# Patient Record
Sex: FEMALE | Race: WHITE | NOT HISPANIC OR LATINO | URBAN - METROPOLITAN AREA
[De-identification: names, ages, dates, MRNs, and addresses within clinical notes are randomized per-mention and may not be internally consistent; named-entity substitution may affect disease eponyms.]

---

## 2024-09-25 ENCOUNTER — CLINICAL SUPPORT (OUTPATIENT)
Dept: EMERGENCY MEDICINE | Facility: HOSPITAL | Age: 64
End: 2024-09-25
Payer: COMMERCIAL

## 2024-09-25 ENCOUNTER — APPOINTMENT (OUTPATIENT)
Dept: RADIOLOGY | Facility: HOSPITAL | Age: 64
End: 2024-09-25
Payer: COMMERCIAL

## 2024-09-25 ENCOUNTER — HOSPITAL ENCOUNTER (OUTPATIENT)
Facility: HOSPITAL | Age: 64
Setting detail: OBSERVATION
Discharge: HOME | End: 2024-09-25
Attending: STUDENT IN AN ORGANIZED HEALTH CARE EDUCATION/TRAINING PROGRAM
Payer: COMMERCIAL

## 2024-09-25 ENCOUNTER — APPOINTMENT (OUTPATIENT)
Dept: CARDIOLOGY | Facility: HOSPITAL | Age: 64
End: 2024-09-25
Payer: COMMERCIAL

## 2024-09-25 VITALS
SYSTOLIC BLOOD PRESSURE: 102 MMHG | WEIGHT: 160 LBS | HEIGHT: 66 IN | HEART RATE: 67 BPM | OXYGEN SATURATION: 100 % | BODY MASS INDEX: 25.71 KG/M2 | DIASTOLIC BLOOD PRESSURE: 88 MMHG | RESPIRATION RATE: 17 BRPM | TEMPERATURE: 96.8 F

## 2024-09-25 DIAGNOSIS — R00.1 BRADYCARDIA: ICD-10-CM

## 2024-09-25 DIAGNOSIS — R55 SYNCOPE, UNSPECIFIED SYNCOPE TYPE: Primary | ICD-10-CM

## 2024-09-25 LAB
ALBUMIN SERPL BCP-MCNC: 4.1 G/DL (ref 3.4–5)
ALP SERPL-CCNC: 77 U/L (ref 33–136)
ALT SERPL W P-5'-P-CCNC: 38 U/L (ref 7–45)
ANION GAP SERPL CALC-SCNC: 12 MMOL/L (ref 10–20)
AORTIC VALVE PEAK VELOCITY: 0.92 M/S
AST SERPL W P-5'-P-CCNC: 33 U/L (ref 9–39)
AV PEAK GRADIENT: 3.4 MMHG
AVA (PEAK VEL): 3.63 CM2
BASOPHILS # BLD AUTO: 0.05 X10*3/UL (ref 0–0.1)
BASOPHILS NFR BLD AUTO: 1 %
BILIRUB SERPL-MCNC: 0.4 MG/DL (ref 0–1.2)
BUN SERPL-MCNC: 21 MG/DL (ref 6–23)
CALCIUM SERPL-MCNC: 9.5 MG/DL (ref 8.6–10.6)
CARDIAC TROPONIN I PNL SERPL HS: <3 NG/L (ref 0–34)
CARDIAC TROPONIN I PNL SERPL HS: <3 NG/L (ref 0–34)
CHLORIDE SERPL-SCNC: 106 MMOL/L (ref 98–107)
CO2 SERPL-SCNC: 27 MMOL/L (ref 21–32)
CREAT SERPL-MCNC: 0.88 MG/DL (ref 0.5–1.05)
EGFRCR SERPLBLD CKD-EPI 2021: 73 ML/MIN/1.73M*2
EJECTION FRACTION APICAL 4 CHAMBER: 72.6
EJECTION FRACTION: 60 %
EOSINOPHIL # BLD AUTO: 0.24 X10*3/UL (ref 0–0.7)
EOSINOPHIL NFR BLD AUTO: 4.8 %
ERYTHROCYTE [DISTWIDTH] IN BLOOD BY AUTOMATED COUNT: 13.4 % (ref 11.5–14.5)
GLUCOSE SERPL-MCNC: 106 MG/DL (ref 74–99)
HCT VFR BLD AUTO: 38.4 % (ref 36–46)
HGB BLD-MCNC: 12.7 G/DL (ref 12–16)
IMM GRANULOCYTES # BLD AUTO: 0.01 X10*3/UL (ref 0–0.7)
IMM GRANULOCYTES NFR BLD AUTO: 0.2 % (ref 0–0.9)
LEFT ATRIUM VOLUME AREA LENGTH INDEX BSA: 32.3 ML/M2
LEFT VENTRICLE INTERNAL DIMENSION DIASTOLE: 4.45 CM (ref 3.5–6)
LEFT VENTRICULAR OUTFLOW TRACT DIAMETER: 2.16 CM
LYMPHOCYTES # BLD AUTO: 2.25 X10*3/UL (ref 1.2–4.8)
LYMPHOCYTES NFR BLD AUTO: 45.5 %
MCH RBC QN AUTO: 29 PG (ref 26–34)
MCHC RBC AUTO-ENTMCNC: 33.1 G/DL (ref 32–36)
MCV RBC AUTO: 88 FL (ref 80–100)
MITRAL VALVE E/A RATIO: 1.27
MONOCYTES # BLD AUTO: 0.46 X10*3/UL (ref 0.1–1)
MONOCYTES NFR BLD AUTO: 9.3 %
NEUTROPHILS # BLD AUTO: 1.94 X10*3/UL (ref 1.2–7.7)
NEUTROPHILS NFR BLD AUTO: 39.2 %
NRBC BLD-RTO: 0 /100 WBCS (ref 0–0)
PLATELET # BLD AUTO: 245 X10*3/UL (ref 150–450)
POTASSIUM SERPL-SCNC: 3.8 MMOL/L (ref 3.5–5.3)
PROT SERPL-MCNC: 6.7 G/DL (ref 6.4–8.2)
RBC # BLD AUTO: 4.38 X10*6/UL (ref 4–5.2)
RIGHT VENTRICLE FREE WALL PEAK S': 10 CM/S
RIGHT VENTRICLE PEAK SYSTOLIC PRESSURE: 23.5 MMHG
SODIUM SERPL-SCNC: 141 MMOL/L (ref 136–145)
TRICUSPID ANNULAR PLANE SYSTOLIC EXCURSION: 2 CM
WBC # BLD AUTO: 5 X10*3/UL (ref 4.4–11.3)

## 2024-09-25 PROCEDURE — 80053 COMPREHEN METABOLIC PANEL: CPT | Performed by: STUDENT IN AN ORGANIZED HEALTH CARE EDUCATION/TRAINING PROGRAM

## 2024-09-25 PROCEDURE — 71046 X-RAY EXAM CHEST 2 VIEWS: CPT | Performed by: STUDENT IN AN ORGANIZED HEALTH CARE EDUCATION/TRAINING PROGRAM

## 2024-09-25 PROCEDURE — 93005 ELECTROCARDIOGRAM TRACING: CPT

## 2024-09-25 PROCEDURE — 96360 HYDRATION IV INFUSION INIT: CPT

## 2024-09-25 PROCEDURE — 84484 ASSAY OF TROPONIN QUANT: CPT | Performed by: STUDENT IN AN ORGANIZED HEALTH CARE EDUCATION/TRAINING PROGRAM

## 2024-09-25 PROCEDURE — 36415 COLL VENOUS BLD VENIPUNCTURE: CPT | Performed by: EMERGENCY MEDICINE

## 2024-09-25 PROCEDURE — 93306 TTE W/DOPPLER COMPLETE: CPT | Performed by: INTERNAL MEDICINE

## 2024-09-25 PROCEDURE — 96361 HYDRATE IV INFUSION ADD-ON: CPT

## 2024-09-25 PROCEDURE — 2500000004 HC RX 250 GENERAL PHARMACY W/ HCPCS (ALT 636 FOR OP/ED)

## 2024-09-25 PROCEDURE — 93880 EXTRACRANIAL BILAT STUDY: CPT

## 2024-09-25 PROCEDURE — G0378 HOSPITAL OBSERVATION PER HR: HCPCS

## 2024-09-25 PROCEDURE — 80053 COMPREHEN METABOLIC PANEL: CPT | Performed by: EMERGENCY MEDICINE

## 2024-09-25 PROCEDURE — 93306 TTE W/DOPPLER COMPLETE: CPT

## 2024-09-25 PROCEDURE — 99285 EMERGENCY DEPT VISIT HI MDM: CPT | Mod: 25

## 2024-09-25 PROCEDURE — 85025 COMPLETE CBC W/AUTO DIFF WBC: CPT | Performed by: EMERGENCY MEDICINE

## 2024-09-25 PROCEDURE — 36415 COLL VENOUS BLD VENIPUNCTURE: CPT | Performed by: STUDENT IN AN ORGANIZED HEALTH CARE EDUCATION/TRAINING PROGRAM

## 2024-09-25 PROCEDURE — 85025 COMPLETE CBC W/AUTO DIFF WBC: CPT | Performed by: STUDENT IN AN ORGANIZED HEALTH CARE EDUCATION/TRAINING PROGRAM

## 2024-09-25 PROCEDURE — 2500000004 HC RX 250 GENERAL PHARMACY W/ HCPCS (ALT 636 FOR OP/ED): Performed by: NURSE PRACTITIONER

## 2024-09-25 PROCEDURE — 93880 EXTRACRANIAL BILAT STUDY: CPT | Performed by: RADIOLOGY

## 2024-09-25 PROCEDURE — 71046 X-RAY EXAM CHEST 2 VIEWS: CPT

## 2024-09-25 RX ORDER — ACETAMINOPHEN 325 MG/1
650 TABLET ORAL EVERY 6 HOURS PRN
Status: DISCONTINUED | OUTPATIENT
Start: 2024-09-25 | End: 2024-09-25 | Stop reason: HOSPADM

## 2024-09-25 RX ORDER — VIT C/E/ZN/COPPR/LUTEIN/ZEAXAN 250MG-90MG
1 CAPSULE ORAL
COMMUNITY

## 2024-09-25 RX ORDER — ONDANSETRON 4 MG/1
4 TABLET, ORALLY DISINTEGRATING ORAL EVERY 8 HOURS PRN
Status: DISCONTINUED | OUTPATIENT
Start: 2024-09-25 | End: 2024-09-25 | Stop reason: HOSPADM

## 2024-09-25 RX ORDER — SODIUM CHLORIDE, SODIUM LACTATE, POTASSIUM CHLORIDE, CALCIUM CHLORIDE 600; 310; 30; 20 MG/100ML; MG/100ML; MG/100ML; MG/100ML
75 INJECTION, SOLUTION INTRAVENOUS CONTINUOUS
Status: DISCONTINUED | OUTPATIENT
Start: 2024-09-25 | End: 2024-09-25 | Stop reason: HOSPADM

## 2024-09-25 RX ORDER — ACETAMINOPHEN 650 MG/1
650 SUPPOSITORY RECTAL EVERY 6 HOURS PRN
Status: DISCONTINUED | OUTPATIENT
Start: 2024-09-25 | End: 2024-09-25

## 2024-09-25 RX ORDER — POLYETHYLENE GLYCOL 3350 17 G/17G
17 POWDER, FOR SOLUTION ORAL DAILY
Status: DISCONTINUED | OUTPATIENT
Start: 2024-09-25 | End: 2024-09-25 | Stop reason: HOSPADM

## 2024-09-25 RX ORDER — ONDANSETRON HYDROCHLORIDE 2 MG/ML
4 INJECTION, SOLUTION INTRAVENOUS EVERY 8 HOURS PRN
Status: DISCONTINUED | OUTPATIENT
Start: 2024-09-25 | End: 2024-09-25 | Stop reason: HOSPADM

## 2024-09-25 RX ORDER — BISMUTH SUBSALICYLATE 262 MG
1 TABLET,CHEWABLE ORAL DAILY
COMMUNITY

## 2024-09-25 RX ORDER — ACETAMINOPHEN 160 MG/5ML
650 SOLUTION ORAL EVERY 6 HOURS PRN
Status: DISCONTINUED | OUTPATIENT
Start: 2024-09-25 | End: 2024-09-25

## 2024-09-25 ASSESSMENT — PAIN SCALES - GENERAL: PAINLEVEL_OUTOF10: 0 - NO PAIN

## 2024-09-25 ASSESSMENT — PAIN - FUNCTIONAL ASSESSMENT
PAIN_FUNCTIONAL_ASSESSMENT: 0-10
PAIN_FUNCTIONAL_ASSESSMENT: 0-10

## 2024-09-25 ASSESSMENT — COLUMBIA-SUICIDE SEVERITY RATING SCALE - C-SSRS
1. IN THE PAST MONTH, HAVE YOU WISHED YOU WERE DEAD OR WISHED YOU COULD GO TO SLEEP AND NOT WAKE UP?: NO
2. HAVE YOU ACTUALLY HAD ANY THOUGHTS OF KILLING YOURSELF?: NO
6. HAVE YOU EVER DONE ANYTHING, STARTED TO DO ANYTHING, OR PREPARED TO DO ANYTHING TO END YOUR LIFE?: NO

## 2024-09-25 NOTE — ED TRIAGE NOTES
Per pt  she had a syncope episode after jumping out of bed to check on her son that was having chest pain; he states that when she came back an sat on the bed she passed out.

## 2024-09-25 NOTE — ED PROVIDER NOTES
Emergency Department Provider Note        History of Present Illness     History provided by: Patient  Limitations to History: None  External Records Reviewed with Brief Summary: Outpatient progress note from 4/29/2024 which showed annual wellness visit with no chronic medical problems    HPI:  Alona Keller is a 63 y.o. female with no significant PMHx who presents to the emergency department after syncopal episode at home.  Per her , patient got out of bed to check on son who is having chest pain, and when she got back and sat on the bed, she lost consciousness, lying back on the bed. No fall, no heads strike, reports no pain.  Patient reports no lightheadedness/dizziness, no palpitations, no chest pain, no shortness of breath prior to loss of consciousness, however when she woke up she became lightheaded, sweaty, and nauseated.    Patient reports no chronic medical conditions, she does have a family history of heart disease, however has been seeing a cardiologist annually and has never been diagnosed with any cardiac issues.     She reports she becomes lightheaded when she sits up.    Physical Exam   Triage vitals:  T 36.2 °C (97.2 °F)  HR 56  /71  RR 16  O2 100 % None (Room air)    Physical exam:   Triage vitals reviewed.  Constitutional: Well developed adult in no acute distress, non toxic in appearance  Head: Normocephalic, atraumatic  Skin: Intact, dry. No rashes or lesions.  Eyes: Pupils are equal. No conjunctival injection.  Neck: Supple. Trachea is midline.  Pulmonary: Normal work of breathing with no accessory muscle use noted.  Clear to auscultation bilaterally.   Cardiovascular: Normal rate, regular rhythm. No murmurs/gallops/rubs appreciated. 2+ radial and PT pulses bilaterally.   Abdomen: Soft, nondistended. Nontender to palpation.  Extremities: No gross deformities.  Moving all extremities spontaneously without difficulty.  Neuro: Awake and alert, oriented x 4. Face is symmetric.  Speech is clear.  Normal shrug and head turn against resistance.  Strength 5/5 in bilateral upper and lower extremities.  Sensation intact to light touch throughout  Psych: Appears somewhat anxious, however no psychomotor agitation    Medical Decision Making & ED Course   Medical Decision Makin y.o. female presents to ED after syncopal episode.  On arrival, patient is afebrile, HR 56, /71, SpO2 100% on room air.  On exam, she has no focal neurologic deficits, heart rate is regular with no murmur/gallop/rubs, lung sounds are clear.    EKG shows sinus bradycardia, troponin negative x2, so low concern for ACS.  CBC remarkable, so low concern for infection or anemia as a cause for syncope.  CMP unremarkable, so low concern for acute electrolyte abnormality.    Given syncope with no prodrome, I have some concern for cardiac etiology of her syncope.  Additionally I am also concerned for symptomatic bradycardia.  This seems less likely, however cannot be discounted in the setting of a recent syncopal event.  ----    Differential diagnoses considered include but are not limited to: Symptomatic bradycardia, vasovagal syncope, dehydration     Social Determinants of Health which Significantly Impact Care: None identified     EKG Independent Interpretation: EKG interpreted by myself. Please see ED Course for full interpretation.    Independent Result Review and Interpretation: Relevant laboratory and radiographic results were reviewed and independently interpreted by myself.  As necessary, they are commented on in the ED Course.    Chronic conditions affecting the patient's care: As documented above in [facility]    The patient was discussed with the following consultants/services: Discussed with CDU RUBIA, who accepted patient for admission to CDU.    Care Considerations: As documented above in [facility]    ED Course:  ED Course as of 24 0658   Wed Sep 25, 2024   0448 CBC and Auto Differential  CBC unremarkable [HH]   0448  ECG 12 lead  EKG interpreted by me (ED resident): 55 bpm, sinus bradycardia with a normal axis.  , QTc 422, QRS 72.  No ST elevation, depressions, or T wave inversions concerning for ischemia.  No prior EKG available for comparison. [HH]   0502 Attending summary:  62 y/o healthy F presenting for a syncopal episode. Got out of bed bc her son reported chest pain, then went to the bathroom and sat on the side of her bed. Next thing she knows she was sitting on the floor sweating.  said she was out for about 2-3 minutes and had significant diaphoresis. She was at baseline prior to this. No recent illnesses. No preceding symptoms. No recent travel, no DVT/PE hx. No HA or neuro symptoms. No abd or back pain. Never had similar symptoms.     Vitals unremarkable other than HR 56. Pt reports this is normal for her. Exam shows well appearing female with normal neuro exam and AO x4, unremarkable cardiopulm exam without murmurs, no LE tenderness or edema.     Highest suspicion for cardiogenic syncope with no preceding symptoms. Symptomatic bradycardia considered however pt reports baseline. No signs or symptoms concerning for PE, SAH, aortic dissection. EKG without arrhythmia, WPW, AV block, prolonged QT. Normal trop, normal labs. Plan for CDU admission for syncope.  [SS]   0616 XR chest 2 views  CXR reviewed without evidence of PTX, PNA, or widened mediastinum. [HH]      ED Course User Index  [HH] Karina Lee MD  [SS] Ariana Hood MD         Diagnoses as of 09/25/24 0658   Syncope, unspecified syncope type   Bradycardia     Disposition   As a result of their workup, the patient will require admission to the CDU.  The patient was informed of her diagnosis.  The patient was given the opportunity to ask questions and I answered them. The patient agreed to be admitted to the CDO.      Patient seen and discussed with ED attending physician.    Karina Lee MD  Emergency Medicine     Karina Lee,  MD  Resident  09/25/24 0780

## 2024-09-25 NOTE — DISCHARGE SUMMARY
[facility] CLINICAL DECISION UNIT  DISCHARGE SUMMARY    Patient: Alona Keller  MRN: [id number]  : 1960  Date of Evaluation: 2024  ED Provider: NICOLE Moura    Limitations to history: None  Independent Historian: Yes  External Records Reviewed: Yes      Subjective:  Alona Keller is a 63-year-old female who has undergone comprehensive diagnostic evaluation and therapeutic management in accordance with the CDU guidelines for syncope. Based on Mrs. Keller's clinical response and diagnostic information during this period of observation, it has been determined that the patient will be discharged.    The acute evaluation included:  Orders Placed This Encounter   Procedures    XR chest 2 views    CBC and Auto Differential    Comprehensive metabolic panel    Troponin Series, (0, 1 HR)    Troponin I, High Sensitivity, Initial    Troponin, High Sensitivity, 1 Hour    Adult diet Regular    Communication - Please obtain previous EKG if available    NIPP Communication - EKG    Vital Signs    Notify provider (specify parameters)    Weight on admission    Height on admission    Activity (specify) Ambulate    Telemetry monitoring - Floor only    Orthostatic vitals    Full code    ECG 12 lead    Electrocardiogram, 12-lead PRN ACS symptoms    Transthoracic Echo (TTE) Complete    Send to CDU    Initiate observation status    Fall precautions       Results for orders placed or performed during the hospital encounter of 24   CBC and Auto Differential   Result Value Ref Range    WBC 5.0 4.4 - 11.3 x10*3/uL    nRBC 0.0 0.0 - 0.0 /100 WBCs    RBC 4.38 4.00 - 5.20 x10*6/uL    Hemoglobin 12.7 12.0 - 16.0 g/dL    Hematocrit 38.4 36.0 - 46.0 %    MCV 88 80 - 100 fL    MCH 29.0 26.0 - 34.0 pg    MCHC 33.1 32.0 - 36.0 g/dL    RDW 13.4 11.5 - 14.5 %    Platelets 245 150 - 450 x10*3/uL    Neutrophils % 39.2 40.0 - 80.0 %    Immature Granulocytes %, Automated 0.2 0.0 - 0.9 %    Lymphocytes % 45.5 13.0 - 44.0 %     Monocytes % 9.3 2.0 - 10.0 %    Eosinophils % 4.8 0.0 - 6.0 %    Basophils % 1.0 0.0 - 2.0 %    Neutrophils Absolute 1.94 1.20 - 7.70 x10*3/uL    Immature Granulocytes Absolute, Automated 0.01 0.00 - 0.70 x10*3/uL    Lymphocytes Absolute 2.25 1.20 - 4.80 x10*3/uL    Monocytes Absolute 0.46 0.10 - 1.00 x10*3/uL    Eosinophils Absolute 0.24 0.00 - 0.70 x10*3/uL    Basophils Absolute 0.05 0.00 - 0.10 x10*3/uL   Comprehensive metabolic panel   Result Value Ref Range    Glucose 106 (H) 74 - 99 mg/dL    Sodium 141 136 - 145 mmol/L    Potassium 3.8 3.5 - 5.3 mmol/L    Chloride 106 98 - 107 mmol/L    Bicarbonate 27 21 - 32 mmol/L    Anion Gap 12 10 - 20 mmol/L    Urea Nitrogen 21 6 - 23 mg/dL    Creatinine 0.88 0.50 - 1.05 mg/dL    eGFR 73 >60 mL/min/1.73m*2    Calcium 9.5 8.6 - 10.6 mg/dL    Albumin 4.1 3.4 - 5.0 g/dL    Alkaline Phosphatase 77 33 - 136 U/L    Total Protein 6.7 6.4 - 8.2 g/dL    AST 33 9 - 39 U/L    Bilirubin, Total 0.4 0.0 - 1.2 mg/dL    ALT 38 7 - 45 U/L   Troponin I, High Sensitivity, Initial   Result Value Ref Range    Troponin I, High Sensitivity (CMC) <3 0 - 34 ng/L   Troponin, High Sensitivity, 1 Hour   Result Value Ref Range    Troponin I, High Sensitivity (CMC) <3 0 - 34 ng/L   Transthoracic Echo (TTE) Complete   Result Value Ref Range    AV pk laxmi 0.92 m/s    LVOT diam 2.16 cm    MV E/A ratio 1.27     LA vol index A/L 32.3 ml/m2    Tricuspid annular plane systolic excursion 2.0 cm    LV EF 60 %    RV free wall pk S' 10.00 cm/s    LVIDd 4.45 cm    RVSP 23.5 mmHg    Aortic Valve Area by Continuity of Peak Velocity 3.63 cm2    AV pk grad 3.4 mmHg    LV A4C EF 72.6        Past History     Past Medical History:   Diagnosis Date    No pertinent past medical history      History reviewed. No pertinent surgical history.  Social History     Socioeconomic History    Marital status:    Tobacco Use    Smoking status: Never    Smokeless tobacco: Never   Vaping Use    Vaping status: Never Used    Substance and Sexual Activity    Alcohol use: Never    Drug use: Never    Sexual activity: Yes     Partners: Male   Social History Narrative    Lives with  and son    Works remotely    Exercises 2-3 days a week via Treadmill x 30 minutes a day, also does weight lifting and recently started rowing     Drinks one can a pop on Friday and Saturday    Drinks Decaf Green tea and water    No coffee         Medications/Allergies     Previous Medications    CHOLECALCIFEROL (VITAMIN D-3) 25 MCG (1000 UT) CAPSULE    1 capsule (25 mcg).    MULTIVITAMIN TABLET    Take 1 tablet by mouth once daily.    [id number]--[id number] MG TABLET    Take by mouth.     No Known Allergies    Diagnostics reviewed by DESIRAE Moura-CNP     Labs:  Results for orders placed or performed during the hospital encounter of 09/25/24   CBC and Auto Differential   Result Value Ref Range    WBC 5.0 4.4 - 11.3 x10*3/uL    nRBC 0.0 0.0 - 0.0 /100 WBCs    RBC 4.38 4.00 - 5.20 x10*6/uL    Hemoglobin 12.7 12.0 - 16.0 g/dL    Hematocrit 38.4 36.0 - 46.0 %    MCV 88 80 - 100 fL    MCH 29.0 26.0 - 34.0 pg    MCHC 33.1 32.0 - 36.0 g/dL    RDW 13.4 11.5 - 14.5 %    Platelets 245 150 - 450 x10*3/uL    Neutrophils % 39.2 40.0 - 80.0 %    Immature Granulocytes %, Automated 0.2 0.0 - 0.9 %    Lymphocytes % 45.5 13.0 - 44.0 %    Monocytes % 9.3 2.0 - 10.0 %    Eosinophils % 4.8 0.0 - 6.0 %    Basophils % 1.0 0.0 - 2.0 %    Neutrophils Absolute 1.94 1.20 - 7.70 x10*3/uL    Immature Granulocytes Absolute, Automated 0.01 0.00 - 0.70 x10*3/uL    Lymphocytes Absolute 2.25 1.20 - 4.80 x10*3/uL    Monocytes Absolute 0.46 0.10 - 1.00 x10*3/uL    Eosinophils Absolute 0.24 0.00 - 0.70 x10*3/uL    Basophils Absolute 0.05 0.00 - 0.10 x10*3/uL   Comprehensive metabolic panel   Result Value Ref Range    Glucose 106 (H) 74 - 99 mg/dL    Sodium 141 136 - 145 mmol/L    Potassium 3.8 3.5 - 5.3 mmol/L    Chloride 106 98 - 107 mmol/L    Bicarbonate  27 21 - 32 mmol/L    Anion Gap 12 10 - 20 mmol/L    Urea Nitrogen 21 6 - 23 mg/dL    Creatinine 0.88 0.50 - 1.05 mg/dL    eGFR 73 >60 mL/min/1.73m*2    Calcium 9.5 8.6 - 10.6 mg/dL    Albumin 4.1 3.4 - 5.0 g/dL    Alkaline Phosphatase 77 33 - 136 U/L    Total Protein 6.7 6.4 - 8.2 g/dL    AST 33 9 - 39 U/L    Bilirubin, Total 0.4 0.0 - 1.2 mg/dL    ALT 38 7 - 45 U/L   Troponin I, High Sensitivity, Initial   Result Value Ref Range    Troponin I, High Sensitivity (CMC) <3 0 - 34 ng/L   Troponin, High Sensitivity, 1 Hour   Result Value Ref Range    Troponin I, High Sensitivity (CMC) <3 0 - 34 ng/L   Transthoracic Echo (TTE) Complete   Result Value Ref Range    AV pk laxmi 0.92 m/s    LVOT diam 2.16 cm    MV E/A ratio 1.27     LA vol index A/L 32.3 ml/m2    Tricuspid annular plane systolic excursion 2.0 cm    LV EF 60 %    RV free wall pk S' 10.00 cm/s    LVIDd 4.45 cm    RVSP 23.5 mmHg    Aortic Valve Area by Continuity of Peak Velocity 3.63 cm2    AV pk grad 3.4 mmHg    LV A4C EF 72.6      Radiographs:  Vascular US carotid artery duplex bilateral   Final Result   Normal flow pattern without evidence of hemodynamically relevant   stenosis or atheromatous plaques in the visualized portions of the   carotid circulation as described above.        The velocity criteria are extrapolated from diameter data as defined   by the Society of Radiologists in Ultrasound Consensus Conference   Radiology 2003; 229;340-346.        I personally reviewed the images/study and I agree with the findings   as stated by Dr. Mariusz Kyle. This study was interpreted at [facility], [place], [place].        MACRO:   None        Signed by: Neftali Garcia 9/25/2024 2:33 PM   Dictation workstation:   [id number]      Transthoracic Echo (TTE) Complete   Final Result      XR chest 2 views   Final Result   1.  No evidence of acute cardiopulmonary process.        I personally reviewed the images/study and I agree  "with Dr. Jorge Linton findings as stated. This study was interpreted at [facility], [place], [place]        MACRO:   None        Signed by: John Pride 9/25/2024 9:19 AM   Dictation workstation:   [id number]            Pertinent Physical Exam At Time of Discharge  Physical Exam     Visit Vitals  /54 (BP Location: Left arm)   Pulse 60   Temp 36.2 °C (97.2 °F) (Temporal)   Resp 14   Ht 1.676 m (5' 6\")   Wt 72.6 kg (160 lb)   SpO2 99%   BMI 25.82 kg/m²   Smoking Status Never   BSA 1.84 m²     Physical Exam:  VS: As documented in the triage note and EMR flowsheet from this visit were reviewed.  GEN: Alert, nontoxic-appearing female in no acute distress.  HEENT: NCAT, PERRL. EOMI.   NECK: Supple, no lymphadenopathy  CHEST: Lungs clear to throughout, bilaterally. No wheezing, rhonchi, or rales  HEART: RRR, Normal S1, S2.  No murmurs/rubs/gallops.  ABD: Soft, non-surgical. No guarding, rebound tenderness or palpable mass.   EXT : Moving BUE and BLE at baseline. No clubbing, cyanosis or edema.  NEURO: Alert, oriented, and appropriately interactive. No focal neurological deficits.   PSYCH: Calm and cooperative. Elana     Consultants  1) None      Impression and Plan  In summary, Alona Keller has been cared for according to the standard [facility] Clinical Decision Unit observation protocol for Syncope.  Underwent an ultrasound of bilateral carotid arteries which showed normal flow pattern without evidence of hemodynamically relevant stenosis or atheromatosis plaques in the visualized portions of the carotid circulation.  Echocardiogram reveals left ventricular ejection fraction is normal with an estimated EF of 60%, normal right ventricular global systolic function, inferior vena cava appears mildly dilated with IVC inspiratory collapse greater than 50%, negative bubble study.  Patient reevaluated, updated results and plan of care.  Endorses continues " to feel back to baseline, denies recurrence of symptoms.  Patient states that she has been up and out of bed walking to and from the bathroom without difficulty.  Feels comfortable with plan and voiced desire to go home.  Patient's spouse and family also at bedside during evaluation.  Given events leading up to syncopal event and thus far unrevealing workup I would suspect vasovagal syncope. Reviewed vital signs, stable and afebrile. Physical exam as documented.  Respiratory and cardiac exams unrevealing.  Ms. Keller is in no apparent distress this time and has remained stable throughout her CDU admission. This extended period of observation was specifically required to determine the need for hospitalization. Prior to discharge from observation, the final physical exam is documented above. I have reviewed the results of the labs and imaging that were performed in the ED as well as the CDU. Based on the patient's condition and test results, the patient will be discharged home with recommendations to follow-up with her cardiologist in the next 2 weeks for further direction for additional outpatient testing, also recommended Zio patch Holter monitor to evaluate for any additional underlying cardiogenic sources, 1 week PCP follow-up, strict return precautions discussed.  Patient acknowledged and amenable to plan. Dr. Bob is the CDU disposition attending.    Date and Time of Disposition.   Discharge: 9/25/2024 at 3:16 PM  Admit: 9/25/2024 at 6:45 AM with a CDU LOS of 8 hours    Discharge Diagnosis  Syncope    Issues Requiring Follow-Up  None    Discharge Meds     Your medication list        ASK your doctor about these medications        Instructions Last Dose Given Next Dose Due   cholecalciferol 25 MCG (1000 UT) capsule  Commonly known as: Vitamin D-3           multivitamin tablet           [id number]-hor [phone] mg tablet                  Test Results Pending At Discharge  Pending Labs        No current pending labs.          Outpatient Follow-Up  No future appointments.      Washington Conti III, MSN, CNP  ext [phone]  [facility]  Emergency Medicine/Clinical Decision Unit    Disclaimer: This note was dictated using a speech recognition program.  While an attempt was made at proof reading to minimize errors, minor errors in transcription may be present

## 2024-09-25 NOTE — DISCHARGE INSTRUCTIONS
CDU COURSE:  Alona Keller, you are admitted to the CDU for further evaluation following a syncopal episode.  You underwent an echocardiogram and an ultrasound of bilateral carotid arteries.  Echocardiogram showed normal EF 60%, bubble study was negative. Ultrasound of carotid arteries showed no flow restrictions.  You endorsed feeling back to your baseline.  As discussed, I would recommend reaching out to Dr. Valentine your cardiologist to see if a Holter monitor to be ordered this could not be done during your CDU admission.    At this time it is safe for you to be discharged home.    DISCHARGE HOME PLAN:  -- Drink plenty of fluids to maintain hydration  -- For the short-term change positions slowly  -- Recommend Holter monitor under the direction of your cardiologist  -- Follow-up with your cardiologist Dr. Valentine in the next 2 weeks to facilitate Holter monitor and determine if additional outpatient testing is warranted  --1 week follow-up with your primary care provider    RETURN TO THE NEAREST EMERGENCY DEPARTMENT WITH WORSENING SYMPTOMS OR NEW CONCERNS ARISE

## 2024-09-25 NOTE — H&P
[facility] CLINICAL DECISION  HISTORY AND PHYSICAL EXAM      Patient: Alona Keller  MRN: [id number]  : 1960  Date of Evaluation: 2024  ED Provider: NICOLE Moura    Limitations to history: None  Independent Historian: Yes  External Records Reviewed: Yes      Patient History:  Alona Keller is a 63-year-old female with reports of no significant past medical history who presented to the emergency department following a syncopal event.  Patient states earlier this morning she suddenly woke up due to her son complaining of chest discomfort, upon returning to her bed she sat down felt very lightheaded, hot, then broke out in a sweat, then reportedly suffered a syncopal event witnessed by her .  Patient states that she does not recall this, states that she thought she just laid back in bed, patient states upon sitting up she remained lightheaded, then decided to sit on the floor.  Once EMS arrived patient started to feel somewhat better but not at 100%, as time passed she gradually improved and ultimately deferred EMS transport to the ED and was taken by private car.  Patient states that she did have a brief episode of nausea following initial onset of symptoms.  Also reports upon arriving to the ED she was short of breath but attributed to her walking.  Patient denies associated fever, headache, room spinning dizziness, extremity weakness, numbness or tingling, diplopia, blurred vision, chest discomfort, palpitations, orthopnea, lower extremity edema, wheezing, cough, abdominal discomfort, vomiting, diarrhea, constipation or urinary symptoms.    ED Course:  --CBC without leukocytosis or anemia  --CMP reveals slight hyperglycemia, otherwise no electrolyte derangement, ZAYDA or hepatic impairment  --EKG and serial cardiac enzymes without evidence of ACS  --CXR shows no acute cardiopulmonary pathology  --After discussion with the ED provider, a decision was made to admit the  patient to the Clinical Decision Unit.    CDU Presentation:  Upon arrival to the Clinical decision unit, Alona Keller is feeling much better and relatively back to baseline, states with certain changes in position she does feel somewhat lightheaded but nothing long-lasting.  Patient denies any recent illnesses, lengthy travel, surgeries, denies living a sedentary lifestyle, denies prior history of DVTs or PEs.  Patient states she is followed by cardiologist out of [facility] who she sees on an annual basis due to her extensive family history of cardiac disease.  Patient states she did undergo a cardiac stress test which she believes earlier this year and reportedly was unremarkable.  Patient states outside of vitamins and supplements, she does not take any medications that are prescribed to her.  On exam, respiratory and cardiac exams unrevealing.  Abdomen benign.  No focal neurological deficits. Mrs. Keller is well-appearing and in no acute distress at this time.      The acute evaluation included:  Orders Placed This Encounter   Procedures    XR chest 2 views    CBC and Auto Differential    Comprehensive metabolic panel    Troponin Series, (0, 1 HR)    Troponin I, High Sensitivity, Initial    Troponin, High Sensitivity, 1 Hour    Adult diet Regular    Communication - Please obtain previous EKG if available    NIPP Communication - EKG    Vital Signs    Notify provider (specify parameters)    Weight on admission    Height on admission    Activity (specify) Ambulate    Telemetry monitoring - Floor only    Orthostatic vitals    Full code    ECG 12 lead    Electrocardiogram, 12-lead PRN ACS symptoms    Send to CDU    Initiate observation status    Fall precautions       I reviewed the below labs and imaging as ordered by the ED provider:  XR chest 2 views             Labs Reviewed   COMPREHENSIVE METABOLIC PANEL - Abnormal       Result Value    Glucose 106 (*)     Sodium 141      Potassium 3.8       Chloride 106      Bicarbonate 27      Anion Gap 12      Urea Nitrogen 21      Creatinine 0.88      eGFR 73      Calcium 9.5      Albumin 4.1      Alkaline Phosphatase 77      Total Protein 6.7      AST 33      Bilirubin, Total 0.4      ALT 38     SERIAL TROPONIN-INITIAL - Normal    Troponin I, High Sensitivity (CMC) <3      Narrative:     Less than 99th percentile of normal range cutoff-  Female and children under 18 years old <35 ng/L; Male <54 ng/L: Negative  Repeat testing should be performed if clinically indicated.     Female and children under 18 years old  ng/L; Male  ng/L:  Consistent with possible cardiac damage and possible increased clinical   risk. Serial measurements may help to assess extent of myocardial damage.     >120 ng/L: Consistent with cardiac damage, increased clinical risk and  myocardial infarction. Serial measurements may help assess extent of   myocardial damage.      NOTE: Children less than 1 year old may have higher baseline troponin   levels and results should be interpreted in conjunction with the overall   clinical context.    NOTE: Troponin I testing is performed using a different   testing methodology at [facility] than at other   [facility]. Direct result comparisons should only   be made within the same method.     SERIAL TROPONIN, 1 HOUR - Normal    Troponin I, High Sensitivity (CMC) <3      Narrative:     Less than 99th percentile of normal range cutoff-  Female and children under 18 years old <35 ng/L; Male <54 ng/L: Negative  Repeat testing should be performed if clinically indicated.     Female and children under 18 years old  ng/L; Male  ng/L:  Consistent with possible cardiac damage and possible increased clinical   risk. Serial measurements may help to assess extent of myocardial damage.     >120 ng/L: Consistent with cardiac damage, increased clinical risk and  myocardial infarction. Serial measurements may help assess extent  of   myocardial damage.      NOTE: Children less than 1 year old may have higher baseline troponin   levels and results should be interpreted in conjunction with the overall   clinical context.    NOTE: Troponin I testing is performed using a different   testing methodology at [facility] than at other   [facility] hospitals. Direct result comparisons should only   be made within the same method.     CBC WITH AUTO DIFFERENTIAL    WBC 5.0      nRBC 0.0      RBC 4.38      Hemoglobin 12.7      Hematocrit 38.4      MCV 88      MCH 29.0      MCHC 33.1      RDW 13.4      Platelets 245      Neutrophils % 39.2      Immature Granulocytes %, Automated 0.2      Lymphocytes % 45.5      Monocytes % 9.3      Eosinophils % 4.8      Basophils % 1.0      Neutrophils Absolute 1.94      Immature Granulocytes Absolute, Automated 0.01      Lymphocytes Absolute 2.25      Monocytes Absolute 0.46      Eosinophils Absolute 0.24      Basophils Absolute 0.05     TROPONIN SERIES- (INITIAL, 1 HR)    Narrative:     The following orders were created for panel order Troponin Series, (0, 1 HR).  Procedure                               Abnormality         Status                     ---------                               -----------         ------                     Troponin I, High Sensiti...[[id number]]  Normal              Final result               Troponin, High Sensitivi...[[id number]]  Normal              Final result                 Please view results for these tests on the individual orders.       Past History     Past Medical History:   Diagnosis Date    No pertinent past medical history      History reviewed. No pertinent surgical history.  Social History     Socioeconomic History    Marital status:    Tobacco Use    Smoking status: Never    Smokeless tobacco: Never   Vaping Use    Vaping status: Never Used   Substance and Sexual Activity    Alcohol use: Never    Drug use: Never    Sexual activity: Yes     Partners: Male  "  Social History Narrative    Lives with  and son    Works remotely    Exercises 2-3 days a week via Treadmill x 30 minutes a day, also does weight lifting and recently started rowing     Drinks one can a pop on Friday and Saturday    Drinks Decaf Green tea and water    No coffee         Medications/Allergies     Previous Medications    CHOLECALCIFEROL (VITAMIN D-3) 25 MCG (1000 UT) CAPSULE    1 capsule (25 mcg).    MULTIVITAMIN TABLET    Take 1 tablet by mouth once daily.    [id number]--[id number] MG TABLET    Take by mouth.     No Known Allergies    Review of Systems  All systems reviewed and otherwise negative, except as stated above in HPI.    Physical Exam     Visit Vitals  /54 (BP Location: Left arm)   Pulse 60   Temp 36.2 °C (97.2 °F) (Temporal)   Resp 14   Ht 1.676 m (5' 6\")   Wt 72.6 kg (160 lb)   SpO2 99%   BMI 25.82 kg/m²   Smoking Status Never   BSA 1.84 m²     Appearance: Alert, nontoxic-appearing female in no acute distress. Well nourished & well hydrated.  Skin: Intact, no lesions, rash, petechiae or purpura.   Eyes: PERRLA, EOMs intact,  Conjunctiva pink with no redness or exudates. No scleral icterus.   ENT: EACs patent. Bilateral TMs visualized without indication of infection. Nares patent bilaterally. MMM.   Neck: Supple. No lymphadenopathy. ROM at baseline.   Pulmonary: Clear bilaterally with good chest wall excursion. No rales, rhonchi or wheezing. No accessory muscle use or stridor.  Cardiac: Regular rate and rhythm. Normal S1, S2 without murmur, rub, gallop or extrasystole. No JVD, Carotids without bruits.  Abdomen: Soft, non-tender, active bowel sounds.  No rebound tenderness or guarding.    Musculoskeletal: Moving BUE and BLE without difficulty at baseline. No bilateral lower extremity edema appreciated. No deformities or obvious injuries.  Neurological: Alert and oriented x 3.  Speech clear.  Answers questions appropriate.  No focal neurological " deficits.   Psychiatric: Appropriate mood and affect.     Consultants  1) None      Impression and Plan  In summary, Alona Keller is admitted to the [facility] Clinical Decision Unit for syncope. Dr. Billings is the CDU admission attending.  Reviewed prior encounter and current encounter documentation including laboratory studies, diagnostic results and interventions via EMR/Care Everywhere if available. Patient currently appears to be back to baseline.  Denies recent illnesses or prior syncopal episodes. Patient voiced she tries her best to be on top of her health given her extensive family history of cardiac disease.  Wells score and Troup score reveals low risk for PE.     This patient has been risk-stratified based on available history, physical exam, and related study findings. Admission to the observation status for further diagnosis/treatment/monitoring of syncope is warranted clinically. This extended period of observation is specifically required to determine the need for hospitalization.     Plan:  -- Fall precautions  -- Continuous cardiac monitoring  -- Monitor vital signs per unit protocol  -- Echocardiogram  -- Bilateral Carotid US  -- Orthostatic vital signs  -- LR infusion at 75 mL/h  -- Tylenol 650 mg PO q6h PRN  -- Zofran 4 mg IV/ODT q6h PRN  -- Regular diet    When met, appropriate disposition will be arranged.    Washington Conti III, MSN, CNP  ext [phone]  [facility]  Emergency Medicine/Clinical Decision Unit    Disclaimer: This note was dictated using a speech recognition program.  While an attempt was made at proof reading to minimize errors, minor errors in transcription may be present

## 2024-09-26 LAB
ATRIAL RATE: 55 BPM
P AXIS: 75 DEGREES
P OFFSET: 211 MS
P ONSET: 147 MS
PR INTERVAL: 152 MS
Q ONSET: 223 MS
QRS COUNT: 10 BEATS
QRS DURATION: 72 MS
QT INTERVAL: 442 MS
QTC CALCULATION(BAZETT): 422 MS
QTC FREDERICIA: 429 MS
R AXIS: 65 DEGREES
T AXIS: 59 DEGREES
T OFFSET: 444 MS
VENTRICULAR RATE: 55 BPM